# Patient Record
Sex: FEMALE | Race: WHITE | Employment: PART TIME | URBAN - METROPOLITAN AREA
[De-identification: names, ages, dates, MRNs, and addresses within clinical notes are randomized per-mention and may not be internally consistent; named-entity substitution may affect disease eponyms.]

---

## 2021-03-02 ENCOUNTER — HOSPITAL ENCOUNTER (EMERGENCY)
Age: 26
Discharge: HOME | End: 2021-03-02
Payer: COMMERCIAL

## 2021-03-02 VITALS — BODY MASS INDEX: 26.68 KG/M2 | OXYGEN SATURATION: 98 % | WEIGHT: 145 LBS | HEIGHT: 62 IN | HEART RATE: 78 BPM

## 2021-03-02 DIAGNOSIS — S61.313A LACERATION OF LEFT MIDDLE FINGER WITHOUT FOREIGN BODY WITH DAMAGE TO NAIL, INITIAL ENCOUNTER: Primary | ICD-10-CM

## 2021-03-02 PROCEDURE — 99282 EMERGENCY DEPT VISIT SF MDM: CPT

## 2021-03-02 NOTE — ED PROVIDER NOTES
insecurity     Worry: Not on file     Inability: Not on file    Transportation needs     Medical: Not on file     Non-medical: Not on file   Tobacco Use    Smoking status: Never Smoker    Smokeless tobacco: Never Used   Substance and Sexual Activity    Alcohol use: Yes     Alcohol/week: 0.0 standard drinks     Comment: occasionally    Drug use: No    Sexual activity: Not on file   Lifestyle    Physical activity     Days per week: Not on file     Minutes per session: Not on file    Stress: Not on file   Relationships    Social connections     Talks on phone: Not on file     Gets together: Not on file     Attends Yarsanism service: Not on file     Active member of club or organization: Not on file     Attends meetings of clubs or organizations: Not on file     Relationship status: Not on file    Intimate partner violence     Fear of current or ex partner: Not on file     Emotionally abused: Not on file     Physically abused: Not on file     Forced sexual activity: Not on file   Other Topics Concern    Not on file   Social History Narrative    ** Merged History Encounter **          No current facility-administered medications for this encounter. Current Outpatient Medications   Medication Sig Dispense Refill    norgestimate-ethinyl estradiol (SPRINTEC 28) 0.25-35 MG-MCG per tablet Take 1 tablet by mouth daily       Facility-Administered Medications Ordered in Other Encounters   Medication Dose Route Frequency Provider Last Rate Last Admin    rabies vaccine,human diploid (IMOVAX) injection 2.5 Units  2.5 Units Intramuscular Once Sol Counter, MD         No Known Allergies      REVIEW OF SYSTEMS    10 systems reviewed, pertinent positives per HPI otherwise noted to be negative    PHYSICAL EXAM  Pulse 78   Ht 5' 2\" (1.575 m)   Wt 145 lb (65.8 kg)   SpO2 98%   BMI 26.52 kg/m²   GENERAL APPEARANCE: Well developed, well nourished. Awake and alert. Cooperative. Observed resting in bed.  No acute distress. HEAD: Normocephalic. Atraumatic. EYES: Sclera is non-icteric. Conjunctiva normal.  ENT: External ears are normal. Mucous membranes are moist.   NECK: Supple. Normal ROM. Trachea mid-line. Cardiac: Regular rate and rhythm. Capillary refill is brisk in bilateral upper extremities. LUNGS: Breathing is unlabored. Speaking comfortably in full sentences. Equal and symmetric chest rise. Abdomen: Non-distended. Musculoskeletal: Normal ROM. No gross deformities or trauma noted. Moving all extremities equally and appropriately. EXTREMITIES: Tenderness to palpation of the left middle finger finger around the laceration. Bruising: None. Erythema: None. Edema: None. Crepitus to palpation: None. Radial pulse +2. Capillary refill less than 3 seconds to the distal left middle finger tip. Able to flex left middle finger at the DIP, PIP, and MCPJ. Neurologically intact with full sensation to light touch. Remainder of the hand and wrist exam is normal.   SKIN: Warm and dry. Skin is with good color. Laceration noted to the lateral aspect of the left distal middle finger alongside the nail bed, small laceration that does extend into the nail plate but nail plate is well adhered to the nailbed. This is an avulsion type of laceration and there is a skin flap that still attached but will eventually result in full flap loss. No deep tendon or deep structural involvement noted, no bony involvement, no obvious foreign bodies, no surrounding erythema, no edema, bruising, or crepitus to palpation, no active bleeding. There is no drainage present. Wound bed is red, there is no slough observed. Charisma-wound is without erythema, fluctuance or induration. NEUROLOGICAL: Alert and oriented. Strength is 5/5 in all extremities and sensation is intact. Psychiatric: Mood and affect anxious. Speech is clear and articulate. LABS  No results found for this visit on 03/02/21.     RADIOLOGY  No results found.    PROCEDURE:  None      ED COURSE/MDM  Patient seen and evaluated. Old records reviewed. I have evaluated this patient. My supervising physician was available for consultation. Delbert Benoit presented to the ED today with above noted complaints. Physical exam does reveal a avulsion type of laceration to the distal aspect of the left middle finger. Due to the timeframe from when the patient initially injured the left finger I did not recommend repair also this wound is not appropriate for repair as there are no wound edges that can be easily approximated. Patient was asking about glue which I advised her I would not recommend this either at this point due to concern for infection. I did personally cleanse the wound and applied a small amount of Neosporin and a Band-Aid to the area. ROM, circulation, and sensation is intact to the distal to the injury. X-ray was not obtained as this wound is extremely superficial and I did not feel imaging was required. The patient did not want a tetanus vaccine as she stated to me that she does not do well with needles or pain. The patient was instructed on wound care, signs and symptoms of infection, when to return to a health care provider. The patient verbalized understanding and agreement with the plan. At this point I do not feel the patient requires further work up and it is reasonable to discharge the patient. Please refer to AVS for further details of the discharge instructions. A discussion was had with the patient regarding diagnosis, treatment/ plan of care, and follow up. All questions were answered. Patient will follow up as directed for further evaluation/treatment. The patient was given strict return precautions as we discussed symptoms that would necessitate return to the ED. Patient will return to ED for new/worsening symptoms. The patient verbalized their understanding and agreement with the above plan.     I estimate there is LOW risk for CELLULITIS, COMPARTMENT SYNDROME, NECROTIZING FASCIITIS, TENDON OR NEUROVASCULAR INJURY, or FOREIGN BODY, thus I consider the discharge disposition reasonable. Also, there is no evidence or peritonitis, sepsis, or toxicity. Raphael Ivy and I have discussed the diagnosis and risks, and we agree with discharging home to follow-up with their primary doctor. We also discussed returning to the Emergency Department immediately if new or worsening symptoms occur. We have discussed the symptoms which are most concerning (e.g., changing or worsening pain, fever, numbness, weakness, cool or painful digits) that necessitate immediate return. Clinical Impression    1. Laceration of left middle finger without foreign body with damage to nail, initial encounter        Discharge Vital Signs:  Pulse 78, height 5' 2\" (1.575 m), weight 145 lb (65.8 kg), SpO2 98 %. Patient was sent home with a prescription for below medication/s. I did [place] patient on appropriate use of these medication. New Prescriptions    No medications on file       [id number] Azeem Weinberg MD  [address]  [phone]    Call   As needed    [facility] [address]  [phone]  Go to   If symptoms worsen      DISPOSITION  Patient was discharged to home in good condition. Comment: Please note this report has been produced using speech recognition software and may contain errors related to that system including errors in grammar, punctuation, and spelling, as well as words and phrases that may be inappropriate. If there are any questions or concerns please feel free to contact the dictating provider for clarification.           MIKAEL Peralta - CNP  03/02/21 [id number]

## 2021-03-02 NOTE — ED NOTES
Lacey scales clean finger and applied antibiotic ointment and bandage. . pt was instructed on care and things to monitor as need of concern. . pt verbalized understanding. . pt discharged in stable condition     Milind Leyva RN  03/02/21 0116

## 2021-10-11 ENCOUNTER — HOSPITAL ENCOUNTER (EMERGENCY)
Age: 26
Discharge: HOME | End: 2021-10-11
Attending: EMERGENCY MEDICINE
Payer: COMMERCIAL

## 2021-10-11 ENCOUNTER — APPOINTMENT (OUTPATIENT)
Dept: CT IMAGING | Age: 26
End: 2021-10-11
Payer: COMMERCIAL

## 2021-10-11 ENCOUNTER — APPOINTMENT (OUTPATIENT)
Dept: ULTRASOUND IMAGING | Age: 26
End: 2021-10-11
Payer: COMMERCIAL

## 2021-10-11 VITALS
WEIGHT: 135 LBS | BODY MASS INDEX: 24.69 KG/M2 | OXYGEN SATURATION: 100 % | DIASTOLIC BLOOD PRESSURE: 70 MMHG | TEMPERATURE: 99 F | RESPIRATION RATE: 16 BRPM | HEART RATE: 70 BPM | SYSTOLIC BLOOD PRESSURE: 113 MMHG

## 2021-10-11 DIAGNOSIS — R11.2 NON-INTRACTABLE VOMITING WITH NAUSEA, UNSPECIFIED VOMITING TYPE: ICD-10-CM

## 2021-10-11 DIAGNOSIS — R10.13 EPIGASTRIC PAIN: Primary | ICD-10-CM

## 2021-10-11 LAB
A/G RATIO: 1.4 (ref 1.1–2.2)
ALBUMIN SERPL-MCNC: 4.4 G/DL (ref 3.4–5)
ALP BLD-CCNC: 44 U/L (ref 40–129)
ALT SERPL-CCNC: 8 U/L (ref 10–40)
ANION GAP SERPL CALCULATED.3IONS-SCNC: 8 MMOL/L (ref 3–16)
AST SERPL-CCNC: 11 U/L (ref 15–37)
BACTERIA: ABNORMAL /HPF
BASOPHILS ABSOLUTE: 0 K/UL (ref 0–0.2)
BASOPHILS RELATIVE PERCENT: 0.4 %
BILIRUB SERPL-MCNC: 0.3 MG/DL (ref 0–1)
BILIRUBIN URINE: NEGATIVE
BLOOD, URINE: NEGATIVE
BUN BLDV-MCNC: 6 MG/DL (ref 7–20)
CALCIUM SERPL-MCNC: 9.7 MG/DL (ref 8.3–10.6)
CHLORIDE BLD-SCNC: 105 MMOL/L (ref 99–110)
CHOLESTEROL, TOTAL: 234 MG/DL (ref 0–199)
CLARITY: CLEAR
CO2: 26 MMOL/L (ref 21–32)
COLOR: YELLOW
CREAT SERPL-MCNC: 0.7 MG/DL (ref 0.6–1.1)
EKG ATRIAL RATE: 68 BPM
EKG DIAGNOSIS: NORMAL
EKG P AXIS: 24 DEGREES
EKG P-R INTERVAL: 124 MS
EKG Q-T INTERVAL: 382 MS
EKG QRS DURATION: 82 MS
EKG QTC CALCULATION (BAZETT): 406 MS
EKG R AXIS: 46 DEGREES
EKG T AXIS: 47 DEGREES
EKG VENTRICULAR RATE: 68 BPM
EOSINOPHILS ABSOLUTE: 0 K/UL (ref 0–0.6)
EOSINOPHILS RELATIVE PERCENT: 0.5 %
EPITHELIAL CELLS, UA: ABNORMAL /HPF (ref 0–5)
GFR AFRICAN AMERICAN: >60
GFR NON-AFRICAN AMERICAN: >60
GLOBULIN: 3.1 G/DL
GLUCOSE BLD-MCNC: 96 MG/DL (ref 70–99)
GLUCOSE URINE: NEGATIVE MG/DL
HCG(URINE) PREGNANCY TEST: NEGATIVE
HCT VFR BLD CALC: 40.9 % (ref 36–48)
HDLC SERPL-MCNC: 77 MG/DL (ref 40–60)
HEMOGLOBIN: 13.9 G/DL (ref 12–16)
KETONES, URINE: NEGATIVE MG/DL
LDL CHOLESTEROL CALCULATED: 135 MG/DL
LEUKOCYTE ESTERASE, URINE: ABNORMAL
LIPASE: 35 U/L (ref 13–60)
LITHIUM DOSE AMOUNT: NORMAL
LITHIUM LEVEL: 1 MMOL/L (ref 0.6–1.2)
LYMPHOCYTES ABSOLUTE: 1.9 K/UL (ref 1–5.1)
LYMPHOCYTES RELATIVE PERCENT: 24.5 %
MAGNESIUM: 2.1 MG/DL (ref 1.8–2.4)
MCH RBC QN AUTO: 28.5 PG (ref 26–34)
MCHC RBC AUTO-ENTMCNC: 34 G/DL (ref 31–36)
MCV RBC AUTO: 83.8 FL (ref 80–100)
MICROSCOPIC EXAMINATION: YES
MONOCYTES ABSOLUTE: 0.3 K/UL (ref 0–1.3)
MONOCYTES RELATIVE PERCENT: 3.3 %
NEUTROPHILS ABSOLUTE: 5.5 K/UL (ref 1.7–7.7)
NEUTROPHILS RELATIVE PERCENT: 71.3 %
NITRITE, URINE: NEGATIVE
PDW BLD-RTO: 12.8 % (ref 12.4–15.4)
PH UA: 7.5 (ref 5–8)
PLATELET # BLD: 266 K/UL (ref 135–450)
PMV BLD AUTO: 7.4 FL (ref 5–10.5)
POTASSIUM REFLEX MAGNESIUM: 4.3 MMOL/L (ref 3.5–5.1)
PROTEIN UA: NEGATIVE MG/DL
RBC # BLD: 4.88 M/UL (ref 4–5.2)
RBC UA: ABNORMAL /HPF (ref 0–4)
SODIUM BLD-SCNC: 139 MMOL/L (ref 136–145)
SPECIFIC GRAVITY UA: 1.01 (ref 1–1.03)
TOTAL PROTEIN: 7.5 G/DL (ref 6.4–8.2)
TRIGL SERPL-MCNC: 112 MG/DL (ref 0–150)
TROPONIN: <0.01 NG/ML
URINE REFLEX TO CULTURE: ABNORMAL
URINE TYPE: ABNORMAL
UROBILINOGEN, URINE: 0.2 E.U./DL
VLDLC SERPL CALC-MCNC: 22 MG/DL
WBC # BLD: 7.7 K/UL (ref 4–11)
WBC UA: ABNORMAL /HPF (ref 0–5)

## 2021-10-11 PROCEDURE — 80061 LIPID PANEL: CPT

## 2021-10-11 PROCEDURE — 76705 ECHO EXAM OF ABDOMEN: CPT

## 2021-10-11 PROCEDURE — 84703 CHORIONIC GONADOTROPIN ASSAY: CPT

## 2021-10-11 PROCEDURE — 6370000000 HC RX 637 (ALT 250 FOR IP): Performed by: PHYSICIAN ASSISTANT

## 2021-10-11 PROCEDURE — 93005 ELECTROCARDIOGRAM TRACING: CPT | Performed by: PHYSICIAN ASSISTANT

## 2021-10-11 PROCEDURE — 99284 EMERGENCY DEPT VISIT MOD MDM: CPT

## 2021-10-11 PROCEDURE — 85025 COMPLETE CBC W/AUTO DIFF WBC: CPT

## 2021-10-11 PROCEDURE — 80053 COMPREHEN METABOLIC PANEL: CPT

## 2021-10-11 PROCEDURE — 87086 URINE CULTURE/COLONY COUNT: CPT

## 2021-10-11 PROCEDURE — 83735 ASSAY OF MAGNESIUM: CPT

## 2021-10-11 PROCEDURE — 81001 URINALYSIS AUTO W/SCOPE: CPT

## 2021-10-11 PROCEDURE — 80178 ASSAY OF LITHIUM: CPT

## 2021-10-11 PROCEDURE — 6360000004 HC RX CONTRAST MEDICATION: Performed by: EMERGENCY MEDICINE

## 2021-10-11 PROCEDURE — 74177 CT ABD & PELVIS W/CONTRAST: CPT

## 2021-10-11 PROCEDURE — 83690 ASSAY OF LIPASE: CPT

## 2021-10-11 PROCEDURE — 93010 ELECTROCARDIOGRAM REPORT: CPT | Performed by: INTERNAL MEDICINE

## 2021-10-11 PROCEDURE — 84484 ASSAY OF TROPONIN QUANT: CPT

## 2021-10-11 RX ORDER — ONDANSETRON 4 MG/1
4 TABLET, ORALLY DISINTEGRATING ORAL ONCE
Status: COMPLETED | OUTPATIENT
Start: 2021-10-11 | End: 2021-10-11

## 2021-10-11 RX ORDER — ONDANSETRON 4 MG/1
4 TABLET, ORALLY DISINTEGRATING ORAL EVERY 8 HOURS PRN
Qty: 20 TABLET | Refills: 0 | Status: SHIPPED | OUTPATIENT
Start: 2021-10-11

## 2021-10-11 RX ORDER — CLONAZEPAM 0.5 MG/1
0.5 TABLET ORAL ONCE
Status: DISCONTINUED | OUTPATIENT
Start: 2021-10-11 | End: 2021-10-11 | Stop reason: HOSPADM

## 2021-10-11 RX ORDER — SUCRALFATE 1 G/1
1 TABLET ORAL ONCE
Status: COMPLETED | OUTPATIENT
Start: 2021-10-11 | End: 2021-10-11

## 2021-10-11 RX ORDER — FAMOTIDINE 20 MG/1
20 TABLET, FILM COATED ORAL ONCE
Status: COMPLETED | OUTPATIENT
Start: 2021-10-11 | End: 2021-10-11

## 2021-10-11 RX ORDER — FAMOTIDINE 20 MG/1
20 TABLET, FILM COATED ORAL 2 TIMES DAILY
Qty: 60 TABLET | Refills: 0 | Status: SHIPPED | OUTPATIENT
Start: 2021-10-11

## 2021-10-11 RX ORDER — SUCRALFATE 1 G/1
1 TABLET ORAL 2 TIMES DAILY
Qty: 14 TABLET | Refills: 0 | Status: SHIPPED | OUTPATIENT
Start: 2021-10-11 | End: 2021-10-18

## 2021-10-11 RX ADMIN — IOPAMIDOL 75 ML: 755 INJECTION, SOLUTION INTRAVENOUS at 12:42

## 2021-10-11 RX ADMIN — ONDANSETRON 4 MG: 4 TABLET, ORALLY DISINTEGRATING ORAL at 10:00

## 2021-10-11 RX ADMIN — FAMOTIDINE 20 MG: 20 TABLET ORAL at 14:15

## 2021-10-11 RX ADMIN — ONDANSETRON 4 MG: 4 TABLET, ORALLY DISINTEGRATING ORAL at 14:15

## 2021-10-11 RX ADMIN — SUCRALFATE 1 G: 1 TABLET ORAL at 14:15

## 2021-10-11 ASSESSMENT — ENCOUNTER SYMPTOMS
NAUSEA: 1
BACK PAIN: 0
ABDOMINAL PAIN: 1
CHEST TIGHTNESS: 0
DIARRHEA: 0
BLOOD IN STOOL: 0
SHORTNESS OF BREATH: 0
VOMITING: 1
COUGH: 0

## 2021-10-11 ASSESSMENT — PAIN SCALES - GENERAL
PAINLEVEL_OUTOF10: 0
PAINLEVEL_OUTOF10: 3

## 2021-10-11 NOTE — Clinical Note
Miguel Fenton was seen and treated in our emergency department on 10/11/2021. She may return to work on 10/12/2021. If you have any questions or concerns, please don't hesitate to call.       Nickolas Holbrook, [address]

## 2021-10-11 NOTE — ED PROVIDER NOTES
I independently performed a history and physical on Jaclyn Khalil. All diagnostic, treatment, and disposition decisions were made by myself in conjunction with the advanced practice provider. For further details of [facility] emergency department encounter, please see ARIS Perdomo's documentation. Patient is a 59-year-old female presenting today with abdominal pain for the last 3 days associated with vomiting. She does complain of right upper abdominal pain but also states there is some discomfort in the right lower abdomen as well. The main pain is her right upper quadrant. She denies any radiation of the pain to the back. No fever. No diarrhea. No urinary complaints or vaginal concerns. Last menstruation was 1 month ago. She states that she does not believe she is pregnant. She denies any history of gallbladder disease. She denies any chest pain or shortness of breath and denies any pain with breathing. She denies any falls or trauma. No radiation of the pain into the legs. Due to persistent upper abdominal discomfort, she came to the emergency department for further evaluation. I did review her lab work from outside facility and she does have elevated LDL from 2019 although does not take medicine for this. Physical:   Gen: No acute distress. AOx3. Psych: Normal mood and affect  HEENT: NCAT  Neck: supple  Cardiac: RRR, pulses 2+ in upper extremities  Lungs: C2AB, no R/R/W  Abdomen: soft and mild RUQ tenderness with no R/D/G  Neuro: no focal neuro deficits with strength 5/5 in all 4 extremities  Back: no midline or paraspinal tenderness to palpation, no CVAT    The Ekg interpreted by me shows  normal sinus rhythm with a rate of 68  Axis is   Normal  QTc is  normal  Intervals and Durations are unremarkable.       ST Segments: nonspecific changes  No significant change from prior EKG dated - no old EKG  No STEMI           MDM: Patient was evaluated due to having upper abdominal pain with vomiting over the last 3 days. She did have some mild tenderness to palpation to the right upper quadrant. Lower abdominal exam was benign during my evaluation. She denies any chest pain or pain with breathing and story not suggestive of acute coronary syndrome or pulmonary embolism. She did have elevated LDL in the past and therefore we will obtain an EKG along with troponin to be safe. Ultrasound of the gallbladder did not show any signs of gallstones and CT scan did not show any concern for appendicitis. Her main pain was upper abdominal pain at this time I have low suspicion for ovarian cyst or torsion as a cause of her symptoms and CT did not mention any pelvic concerns at this time. Prior to discharge, physician assistant did reevaluate the patient and stated she was feeling better. When I did see the patient, I did tell her we would be checking her lipids but this will need to be monitored and followed up as an outpatient with her primary doctor since it will not come back today. When I did also evaluate the patient initially, she was given return precautions if things did come back reassuring today and ultimately her labs and imaging did come back with no significant pathology and her abdominal exam was overall benign during my assessment. Troponin was negative and story not suggestive of acute coronary syndrome.      Zulay Arcos MD  10/11/21 [id number]

## 2021-10-11 NOTE — ED PROVIDER NOTES
**ADVANCED PRACTICE PROVIDER, I HAVE EVALUATED THIS [facility]  ED  EMERGENCY DEPARTMENT ENCOUNTER      Pt Name: Andrew Webb  JWK:[id number]  Jose 1995  Date of evaluation: 10/11/2021  Provider: ARIS Onofre      Chief Complaint:    Chief Complaint   Patient presents with    Abdominal Pain     started friday night at 2230, vomiting, \" forced myself to throw up\" upper abd pain         Nursing Notes, Past Medical Hx, Past Surgical Hx, Social Hx, Allergies, and Family Hx were all reviewed and agreed with or any disagreements were addressed in the HPI.    HPI: (Location, Duration, Timing, Severity, Quality, Assoc Sx, Context, Modifying factors)    Chief Complaint of abdominal pain. This is a  22 y.o. female who presents to the emergency department via personal transport complaining of abdominal pain that started Friday. She states around 2230 Friday evening she began having epigastric and right upper quadrant abdominal pain that causes nausea. She states \"I force myself to throw up\" at that time and since then she has had intermittent upper abdominal pain associated with nausea and vomiting. She states she is eating and drinking normally. She did take Tylenol without significant pain relief. Currently she rates her pain as a 3/10. She states she has chronic back pain and thought the abdominal pain may be coming from her back. She denies hematemesis, chest pain, shortness of breath, fevers, chills, lightheadedness, or dizziness. She denies dysuria, hematuria. She states her menstrual cycles are normal and she should be starting her period any day she does admit to chronic headaches and is currently taking lithium for depression and anxiety with Klonopin prescribed as needed. PastMedical/Surgical History:      Diagnosis Date    Bruises easily     Depression     Generalized headaches      History reviewed.  No pertinent surgical history. Medications:  Discharge Medication List as of 10/11/2021  2:30 PM      CONTINUE these medications which have NOT CHANGED    Details   norgestimate-ethinyl estradiol ([id number] [id number]) 0.25-35 MG-MCG per tablet Take 1 tablet by mouth daily               Review of Systems:  (2-9 systems needed)  Review of Systems   Constitutional: Negative for chills and fever. HENT: Negative for congestion. Eyes: Negative for visual disturbance. Respiratory: Negative for cough, chest tightness and shortness of breath. Cardiovascular: Negative for chest pain and palpitations. Gastrointestinal: Positive for abdominal pain, nausea and vomiting. Negative for blood in stool and diarrhea. Genitourinary: Negative for dysuria, flank pain, frequency, hematuria, menstrual problem and urgency. Musculoskeletal: Negative for back pain. Skin: Negative for rash. Neurological: Positive for headaches (chronic). Negative for dizziness, weakness and light-headedness. Physical Exam:  Physical Exam  Vitals and nursing note reviewed. Constitutional:       Appearance: Normal appearance. She is not diaphoretic. HENT:      Head: Normocephalic and atraumatic. Nose: Nose normal.      Mouth/Throat:      Mouth: Mucous membranes are moist.   Eyes:      General:         Right eye: No discharge. Left eye: No discharge. Extraocular Movements: Extraocular movements intact. Pupils: Pupils are equal, round, and reactive to light. Cardiovascular:      Rate and Rhythm: Normal rate and regular rhythm. Pulses: Normal pulses. Heart sounds: Normal heart sounds. No murmur heard. No friction rub. No gallop. Pulmonary:      Effort: Pulmonary effort is normal. No respiratory distress. Breath sounds: Normal breath sounds. No stridor. No wheezing, rhonchi or rales. Abdominal:      General: Abdomen is flat. Bowel sounds are normal. There is no distension. Palpations: Abdomen is soft. Tenderness: There is abdominal tenderness in the right upper quadrant, right lower quadrant and epigastric area. There is no right CVA tenderness, left CVA tenderness, guarding or rebound. Positive signs include Sánchez's sign and McBurney's sign. Negative signs include Rovsing's sign. Musculoskeletal:         General: Normal range of motion. Cervical back: Normal range of motion and neck supple. Skin:     General: Skin is warm and dry. Coloration: Skin is not pale. Neurological:      Mental Status: She is alert and oriented to person, place, and time.    Psychiatric:         Mood and Affect: Mood normal.         Behavior: Behavior normal.         MEDICAL DECISION MAKING    Vitals:    Vitals:    10/11/21 1114 10/11/21 1116 10/11/21 1220 10/11/21 1419   BP: 112/68 112/68 112/70 113/70   Pulse: 64  65 70   Resp: 18  16 16   Temp:       TempSrc:       SpO2: 99% 91% 99% 100%   Weight:           LABS:  Labs Reviewed   COMPREHENSIVE METABOLIC PANEL W/ REFLEX TO MG FOR LOW K - Abnormal; Notable for the following components:       Result Value    BUN 6 (*)     ALT 8 (*)     AST 11 (*)     All other components within normal limits    Narrative:     Performed at:  [place] [address] [employer]   Phone [phone]   URINE RT REFLEX TO CULTURE - Abnormal; Notable for the following components:    Leukocyte Esterase, Urine TRACE (*)     All other components within normal limits    Narrative:     Performed at:  [address], [facility] [employer]   Phone [phone]   LIPID PANEL - Abnormal; Notable for the following components:    Cholesterol, Total 234 (*)     HDL 77 (*)     LDL Calculated 135 (*)     All other components within normal limits    Narrative:     Performed at:  [place] [address] [place], [place] [address]   Phone [phone]   MICROSCOPIC URINALYSIS - Abnormal; Notable for the following components:    Bacteria, UA 1+ (*)     All other components within normal limits    Narrative:     Performed at:  [address],  [place], [address] [employer]   Phone [phone]   CULTURE, URINE    Narrative:     ORDER#: [id number]                          ORDERED BY: Tutu Pereira  SOURCE: Urine Clean Catch                  COLLECTED:  10/11/21 10:10  ANTIBIOTICS AT JEFF.:                      RECEIVED :  10/12/21 02:43  Performed at:  [facility]  [address] [place], [place] [address]   Phone [phone]   CBC WITH AUTO DIFFERENTIAL    Narrative:     Performed at:  [address],  [place], [address] [employer]   Phone [phone]   LIPASE    Narrative:     Performed at:  [address],  [place], [address] [employer]   Phone [phone]   PREGNANCY, URINE    Narrative:     Performed at:  [address],  [place], [address] [employer]   Phone [phone]   LITHIUM LEVEL    Narrative:     Performed at:  [address],  [place], [address] [employer]   Phone [phone]   MAGNESIUM    Narrative:     Performed at:  [address],  [place], [address] [employer]   Phone [phone]   TROPONIN    Narrative:     Performed at:  [address],  [place], [address] [employer]   Phone  of labs reviewed and were negative at this time or not returned at the time of this note.     RADIOLOGY:   Non-plain film images such as CT, Ultrasound and MRI are read by the radiologist. ARIS Riley have directly visualized the radiologic plain film image(s) with the below findings:      Interpretation per the Radiologist below, if available at the time of this note:    CT ABDOMEN PELVIS W IV CONTRAST Additional Contrast? None   Final Result   1. No acute intra-abdominal abnormality. 2. No acute intrapelvic abnormality. US GALLBLADDER RUQ   Final Result   1. No acute abnormality. MEDICAL DECISION MAKING / ED COURSE:      Patient was seen and evaluated by myself and attending physician. All diagnostic, treatment, and disposition decisions were made in conjunction with attending physician. Patient was given:  Medications   ondansetron (ZOFRAN-ODT) disintegrating tablet 4 mg (4 mg Oral Given 10/11/21 1000)   iopamidol (ISOVUE-370) 76 % injection 75 mL (75 mLs IntraVENous Given 10/11/21 1242)   famotidine (PEPCID) tablet 20 mg (20 mg Oral Given 10/11/21 1415)   sucralfate (CARAFATE) tablet 1 g (1 g Oral Given 10/11/21 1415)   ondansetron (ZOFRAN-ODT) disintegrating tablet 4 mg (4 mg Oral Given 10/11/21 1415)       Patient presents to the emergency department for abdominal pain. She is hemodynamically stable at triage. She does have epigastric, right upper and right lower quadrant tenderness on physical exam.  Therefore we will obtain a right upper quadrant ultrasound, CT abdomen and pelvis and basic lab work including cardiac work-up due to epigastric pain and history of elevated cholesterol. CBC without evidence of leukocytosis or acute anemia  CMP with maintained renal function, no acute electrolyte malady and normal liver enzymes  Lipase unremarkable  The patient is also on lithium therefore we did obtain a lithium level which is 1.0  Magnesium 2.1  Troponin negative  Urinalysis with trace leukocytes, micro rare bacteria seen therefore we will obtain a urine culture  Negative pregnancy  Abdomen and pelvis without acute intra-abdominal abnormality  Right upper quadrant ultrasound with no evidence of stones or sludge. The patient does have a history of elevated cholesterol, lipid panel pending at this time.     A discussion was had with the patient regarding all lab and imaging results which are very reassuring. She does have a GI doctor to follow-up with therefore she is not provided a referral.  I did prescribe Pepcid, Zofran, and Carafate for symptomatic relief. I advised her to follow-up with her primary care physician on history of elevated cholesterol and lipid panel is pending. She is given strict return precautions and will return to the emergency department for any new or worsening symptoms. The patient tolerated their visit well. I evaluated the patient. The physician was available for consultation as needed. The patient and / or the family were informed of the results of any tests, a time was given to answer questions, a plan was proposed and they agreed with plan.       Results for orders placed or performed during the hospital encounter of 10/11/21   Culture, Urine    Specimen: Urine, clean catch   Result Value Ref Range    Urine Culture, Routine No growth at 18 to 36 hours    CBC Auto Differential   Result Value Ref Range    WBC 7.7 4.0 - 11.0 K/uL    RBC 4.88 4.00 - 5.20 M/uL    Hemoglobin 13.9 12.0 - 16.0 g/dL    Hematocrit 40.9 36.0 - 48.0 %    MCV 83.8 80.0 - 100.0 fL    MCH 28.5 26.0 - 34.0 pg    MCHC 34.0 31.0 - 36.0 g/dL    RDW 12.8 12.4 - 15.4 %    Platelets 274 121 - 380 K/uL    MPV 7.4 5.0 - 10.5 fL    Neutrophils % 71.3 %    Lymphocytes % 24.5 %    Monocytes % 3.3 %    Eosinophils % 0.5 %    Basophils % 0.4 %    Neutrophils Absolute 5.5 1.7 - 7.7 K/uL    Lymphocytes Absolute 1.9 1.0 - 5.1 K/uL    Monocytes Absolute 0.3 0.0 - 1.3 K/uL    Eosinophils Absolute 0.0 0.0 - 0.6 K/uL    Basophils Absolute 0.0 0.0 - 0.2 K/uL   Comprehensive Metabolic Panel w/ Reflex to MG   Result Value Ref Range    Sodium 139 136 - 145 mmol/L    Potassium reflex Magnesium 4.3 3.5 - 5.1 mmol/L    Chloride 105 99 - 110 mmol/L    CO2 26 21 - 32 mmol/L    Anion Gap 8 3 - 16    Glucose 96 70 - 99 mg/dL    BUN 6 (L) 7 - 20 mg/dL    CREATININE 0.7 0.6 - 1.1 mg/dL    GFR Non-African American >60 >60    GFR African American >60 >60    Calcium 9.7 8.3 - 10.6 mg/dL    Total Protein 7.5 6.4 - 8.2 g/dL    Albumin 4.4 3.4 - 5.0 g/dL    Albumin/Globulin Ratio 1.4 1.1 - 2.2    Total Bilirubin 0.3 0.0 - 1.0 mg/dL    Alkaline Phosphatase 44 40 - 129 U/L    ALT 8 (L) 10 - 40 U/L    AST 11 (L) 15 - 37 U/L    Globulin 3.1 Not Established g/dL   Lipase   Result Value Ref Range    Lipase 35.0 13.0 - 60.0 U/L   Urinalysis Reflex to Culture    Specimen: Urine, clean catch   Result Value Ref Range    Color, UA Yellow Straw/Yellow    Clarity, UA Clear Clear    Glucose, Ur Negative Negative mg/dL    Bilirubin Urine Negative Negative    Ketones, Urine Negative Negative mg/dL    Specific Gravity, UA 1.015 1.005 - 1.030    Blood, Urine Negative Negative    pH, UA 7.5 5.0 - 8.0    Protein, UA Negative Negative mg/dL    Urobilinogen, Urine 0.2 <2.0 E.U./dL    Nitrite, Urine Negative Negative    Leukocyte Esterase, Urine TRACE (A) Negative    Microscopic Examination YES     Urine Type NotGiven     Urine Reflex to Culture Not Indicated    Pregnancy, Urine   Result Value Ref Range    HCG(Urine) Pregnancy Test Negative Detects HCG level >20 MIU/mL   Lithium level   Result Value Ref Range    Lithium Lvl 1.0 0.6 - 1.2 mmol/L    Lithium Dose Amount Unknown    Lipid panel   Result Value Ref Range    Cholesterol, Total 234 (H) 0 - 199 mg/dL    Triglycerides 112 0 - 150 mg/dL    HDL 77 (H) 40 - 60 mg/dL    LDL Calculated 135 (H) <100 mg/dL    VLDL Cholesterol Calculated 22 Not Established mg/dL   Microscopic Urinalysis   Result Value Ref Range    WBC, UA 0-2 0 - 5 /HPF    RBC, UA None seen 0 - 4 /HPF    Epithelial Cells, UA 2-5 0 - 5 /HPF    Bacteria, UA 1+ (A) None Seen /HPF   Magnesium   Result Value Ref Range    Magnesium 2.10 1.80 - 2.40 mg/dL   Troponin   Result Value Ref Range    Troponin <0.01 <0.01 ng/mL   EKG 12 Lead   Result Value Ref Range    Ventricular Rate 68 BPM    Atrial Rate 68 BPM    P-R Interval 124 ms    QRS Duration 82 ms    Q-T Interval 382 ms    QTc Calculation (Bazett) 406 ms    P Axis 24 degrees    R Axis 46 degrees    T Axis 47 degrees    Diagnosis       Normal sinus rhythmNormal ECGNo previous ECGs availableConfirmed by MEL MAGANA MD ([id number]) on 10/11/2021 4:33:59 PM       I estimate there is LOW risk for ACUTE APPENDICITIS, BOWEL OBSTRUCTION, CHOLECYSTITIS, DIVERTICULITIS, INCARCERATED HERNIA, PANCREATITIS, PELVIC INFLAMMATORY DISEASE, PERFORATED BOWEL or ULCER, PREGNANCY, or TUBO-OVARIAN ABSCESS, thus I consider the discharge disposition reasonable. Also, there is no evidence or peritonitis, sepsis, or toxicity. Elbert Garcia and I have discussed the diagnosis and risks, and we agree with discharging home to follow-up with their primary doctor. We also discussed returning to the Emergency Department immediately if new or worsening symptoms occur. We have discussed the symptoms which are most concerning (e.g., bloody stool, fever, changing or worsening pain, vomiting) that necessitate immediate return. Final Impression    1. Epigastric pain    2. Non-intractable vomiting with nausea, unspecified vomiting type        Blood pressure 113/70, pulse 70, temperature 99 °F (37.2 °C), temperature source Oral, resp. rate 16, weight 135 lb (61.2 kg), last menstrual period 09/11/2021, SpO2 100 %. CLINICAL IMPRESSION:  1. Epigastric pain    2.  Non-intractable vomiting with nausea, unspecified vomiting type        DISPOSITION Decision To Discharge 10/11/2021 02:02:38 PM      PATIENT REFERRED TO:  [facility]  ED  [address]  Go to   If symptoms worsen    Sharee Jeter MD  [address] [place] [address] [phone]    In 2 days        DISCHARGE MEDICATIONS:  Discharge Medication List as of 10/11/2021  2:30 PM      START taking these medications    Details   ondansetron (ZOFRAN ODT) 4 MG disintegrating tablet Take 1 tablet by mouth every 8 hours as needed for Nausea or Vomiting, Disp-20 tablet, R-0Normal      famotidine (PEPCID) 20 MG tablet Take 1 tablet by mouth 2 times daily, Disp-60 tablet, R-0Normal      sucralfate (CARAFATE) 1 GM tablet Take 1 tablet by mouth 2 times daily for 7 days, Disp-14 tablet, R-0Normal             DISCONTINUED MEDICATIONS:  Discharge Medication List as of 10/11/2021  2:30 PM                 (Please note the MDM and HPI sections of this note were completed with a voice recognition program.  Efforts were made to edit the dictations but occasionally words are mis-transcribed.)    Electronically signed, Shay Rosario,           Shay Rosario  10/14/21 1837

## 2021-10-11 NOTE — Clinical Note
Anthony Hidalgo was seen and treated in our emergency department on 10/11/2021. She may return to work on 10/12/2021. If you have any questions or concerns, please don't hesitate to call.       Shay Foy

## 2021-10-12 LAB — URINE CULTURE, ROUTINE: NORMAL
